# Patient Record
Sex: MALE | Race: WHITE | NOT HISPANIC OR LATINO
[De-identification: names, ages, dates, MRNs, and addresses within clinical notes are randomized per-mention and may not be internally consistent; named-entity substitution may affect disease eponyms.]

---

## 2017-01-25 PROBLEM — Z00.00 ENCOUNTER FOR PREVENTIVE HEALTH EXAMINATION: Status: ACTIVE | Noted: 2017-01-25

## 2017-01-31 ENCOUNTER — NON-APPOINTMENT (OUTPATIENT)
Age: 72
End: 2017-01-31

## 2017-01-31 ENCOUNTER — APPOINTMENT (OUTPATIENT)
Dept: ELECTROPHYSIOLOGY | Facility: CLINIC | Age: 72
End: 2017-01-31

## 2017-01-31 VITALS
BODY MASS INDEX: 29.06 KG/M2 | WEIGHT: 164 LBS | SYSTOLIC BLOOD PRESSURE: 148 MMHG | DIASTOLIC BLOOD PRESSURE: 63 MMHG | OXYGEN SATURATION: 98 % | HEART RATE: 92 BPM | HEIGHT: 63 IN

## 2017-01-31 VITALS — WEIGHT: 164 LBS | HEIGHT: 63 IN | BODY MASS INDEX: 29.06 KG/M2

## 2017-01-31 DIAGNOSIS — Z86.79 PERSONAL HISTORY OF OTHER DISEASES OF THE CIRCULATORY SYSTEM: ICD-10-CM

## 2017-01-31 DIAGNOSIS — Z86.19 PERSONAL HISTORY OF OTHER INFECTIOUS AND PARASITIC DISEASES: ICD-10-CM

## 2017-01-31 DIAGNOSIS — Z87.09 PERSONAL HISTORY OF OTHER DISEASES OF THE RESPIRATORY SYSTEM: ICD-10-CM

## 2017-01-31 DIAGNOSIS — F15.90 OTHER STIMULANT USE, UNSPECIFIED, UNCOMPLICATED: ICD-10-CM

## 2017-01-31 DIAGNOSIS — Z78.9 OTHER SPECIFIED HEALTH STATUS: ICD-10-CM

## 2017-01-31 DIAGNOSIS — Z80.8 FAMILY HISTORY OF MALIGNANT NEOPLASM OF OTHER ORGANS OR SYSTEMS: ICD-10-CM

## 2017-01-31 DIAGNOSIS — Z86.39 PERSONAL HISTORY OF OTHER ENDOCRINE, NUTRITIONAL AND METABOLIC DISEASE: ICD-10-CM

## 2017-01-31 DIAGNOSIS — A04.7 ENTEROCOLITIS DUE TO CLOSTRIDIUM DIFFICILE: ICD-10-CM

## 2017-02-02 ENCOUNTER — OUTPATIENT (OUTPATIENT)
Dept: OUTPATIENT SERVICES | Facility: HOSPITAL | Age: 72
LOS: 1 days | Discharge: HOME | End: 2017-02-02

## 2017-02-02 VITALS
HEART RATE: 86 BPM | OXYGEN SATURATION: 99 % | RESPIRATION RATE: 20 BRPM | DIASTOLIC BLOOD PRESSURE: 71 MMHG | TEMPERATURE: 98 F | WEIGHT: 160.06 LBS | SYSTOLIC BLOOD PRESSURE: 117 MMHG | HEIGHT: 63 IN

## 2017-02-02 DIAGNOSIS — I47.0 RE-ENTRY VENTRICULAR ARRHYTHMIA: ICD-10-CM

## 2017-02-02 DIAGNOSIS — Z90.89 ACQUIRED ABSENCE OF OTHER ORGANS: Chronic | ICD-10-CM

## 2017-02-02 DIAGNOSIS — Z01.810 ENCOUNTER FOR PREPROCEDURAL CARDIOVASCULAR EXAMINATION: ICD-10-CM

## 2017-02-02 DIAGNOSIS — Z98.890 OTHER SPECIFIED POSTPROCEDURAL STATES: Chronic | ICD-10-CM

## 2017-02-02 DIAGNOSIS — Z96.643 PRESENCE OF ARTIFICIAL HIP JOINT, BILATERAL: Chronic | ICD-10-CM

## 2017-02-02 LAB
ANION GAP SERPL CALC-SCNC: 9 MMOL/L — SIGNIFICANT CHANGE UP (ref 5–17)
BASOPHILS # BLD AUTO: 0.1 K/UL — SIGNIFICANT CHANGE UP (ref 0–0.2)
BASOPHILS NFR BLD AUTO: 1.7 % — SIGNIFICANT CHANGE UP (ref 0–2)
BUN SERPL-MCNC: 16 MG/DL — SIGNIFICANT CHANGE UP (ref 7–23)
CALCIUM SERPL-MCNC: 8.8 MG/DL — SIGNIFICANT CHANGE UP (ref 8.5–10.1)
CHLORIDE SERPL-SCNC: 109 MMOL/L — HIGH (ref 96–108)
CO2 SERPL-SCNC: 24 MMOL/L — SIGNIFICANT CHANGE UP (ref 22–31)
CREAT SERPL-MCNC: 0.93 MG/DL — SIGNIFICANT CHANGE UP (ref 0.5–1.3)
EOSINOPHIL # BLD AUTO: 0.1 K/UL — SIGNIFICANT CHANGE UP (ref 0–0.5)
EOSINOPHIL NFR BLD AUTO: 1.8 % — SIGNIFICANT CHANGE UP (ref 0–6)
GLUCOSE SERPL-MCNC: 93 MG/DL — SIGNIFICANT CHANGE UP (ref 70–99)
HCT VFR BLD CALC: 35.6 % — LOW (ref 39–50)
HGB BLD-MCNC: 12.2 G/DL — LOW (ref 13–17)
LYMPHOCYTES # BLD AUTO: 2.1 K/UL — SIGNIFICANT CHANGE UP (ref 1–3.3)
LYMPHOCYTES # BLD AUTO: 32.4 % — SIGNIFICANT CHANGE UP (ref 13–44)
MCHC RBC-ENTMCNC: 32.2 PG — SIGNIFICANT CHANGE UP (ref 27–34)
MCHC RBC-ENTMCNC: 34.2 GM/DL — SIGNIFICANT CHANGE UP (ref 32–36)
MCV RBC AUTO: 94.3 FL — SIGNIFICANT CHANGE UP (ref 80–100)
MONOCYTES # BLD AUTO: 0.5 K/UL — SIGNIFICANT CHANGE UP (ref 0–0.9)
MONOCYTES NFR BLD AUTO: 7.6 % — SIGNIFICANT CHANGE UP (ref 2–14)
NEUTROPHILS # BLD AUTO: 3.8 K/UL — SIGNIFICANT CHANGE UP (ref 1.8–7.4)
NEUTROPHILS NFR BLD AUTO: 56.6 % — SIGNIFICANT CHANGE UP (ref 43–77)
PLATELET # BLD AUTO: 289 K/UL — SIGNIFICANT CHANGE UP (ref 150–400)
POTASSIUM SERPL-MCNC: 3.4 MMOL/L — LOW (ref 3.5–5.3)
POTASSIUM SERPL-SCNC: 3.4 MMOL/L — LOW (ref 3.5–5.3)
RBC # BLD: 3.77 M/UL — LOW (ref 4.2–5.8)
RBC # FLD: 13.1 % — SIGNIFICANT CHANGE UP (ref 10.3–14.5)
SODIUM SERPL-SCNC: 142 MMOL/L — SIGNIFICANT CHANGE UP (ref 135–145)
WBC # BLD: 6.6 K/UL — SIGNIFICANT CHANGE UP (ref 3.8–10.5)
WBC # FLD AUTO: 6.6 K/UL — SIGNIFICANT CHANGE UP (ref 3.8–10.5)

## 2017-02-02 RX ORDER — CHOLECALCIFEROL (VITAMIN D3) 125 MCG
1 CAPSULE ORAL
Qty: 0 | Refills: 0 | COMMUNITY

## 2017-02-02 NOTE — H&P PST ADULT - PSH
History of bilateral hip replacements  2009, 2010  History of tonsillectomy  1948  S/P coronary angiogram  2016  S/P debridement  elbow wound 2010

## 2017-02-02 NOTE — H&P PST ADULT - NSANTHOSAYNRD_GEN_A_CORE
No. MELQUIADES screening performed.  STOP BANG Legend: 0-2 = LOW Risk; 3-4 = INTERMEDIATE Risk; 5-8 = HIGH Risk

## 2017-02-02 NOTE — H&P PST ADULT - HISTORY OF PRESENT ILLNESS
70 y/o male with of history of septic shock, respiratory failure and atrial fibrillation. Pt was admitted at [facility]. Upon discharge from the hospital, pt was told to follow up with cardiologist. Pt was seen and evaluated by Dr. Rowan who recommended EPS. 72 y/o male with of history of septic shock, respiratory failure and atrial fibrillation. Pt was admitted at [facility]. Upon discharge from the hospital, pt was told to follow up with cardiologist. Pt was seen and evaluated by Dr. Rowan who recommended EPS. Denies palpitations, chest pain, SOB.

## 2017-02-02 NOTE — H&P PST ADULT - ASSESSMENT
70 y/o male with history of atrial fibrillation, wide complex arrhythmia. Schedule for EPS with Dr. Rowan.    Plan  Instructed to follow pre procedure instructions as per EP MD/NP.

## 2017-02-02 NOTE — H&P PST ADULT - PMH
Atrial fibrillation  2016  Clostridium difficile infection  2016  Colitis  2016  Herpes zoster    Hypothyroidism    Respiratory failure  intubated x 14 days in 2016  Septic shock  2016  Tachycardia  wide complex tachycardia  Toxic effect of benzodiazepine  2016

## 2017-02-08 DIAGNOSIS — Z01.810 ENCOUNTER FOR PREPROCEDURAL CARDIOVASCULAR EXAMINATION: ICD-10-CM

## 2017-02-08 DIAGNOSIS — I47.0 RE-ENTRY VENTRICULAR ARRHYTHMIA: ICD-10-CM

## 2017-02-15 RX ORDER — SACCHAROMYCES BOULARDII 250 MG
1 POWDER IN PACKET (EA) ORAL
Qty: 0 | Refills: 0 | COMMUNITY

## 2017-02-15 RX ORDER — L.ACIDOPH/B.ANIMALIS/B.LONGUM 15B CELL
1 CAPSULE ORAL
Qty: 0 | Refills: 0 | COMMUNITY

## 2017-02-15 RX ORDER — PREGABALIN 225 MG/1
1 CAPSULE ORAL
Qty: 0 | Refills: 0 | COMMUNITY

## 2017-02-15 RX ORDER — CHOLECALCIFEROL (VITAMIN D3) 125 MCG
1 CAPSULE ORAL
Qty: 0 | Refills: 0 | COMMUNITY

## 2017-02-15 RX ORDER — ASCORBIC ACID 60 MG
1 TABLET,CHEWABLE ORAL
Qty: 0 | Refills: 0 | COMMUNITY

## 2017-02-15 NOTE — ASU PATIENT PROFILE, ADULT - VISION (WITH CORRECTIVE LENSES IF THE PATIENT USUALLY WEARS THEM):
uses reading glasses/Partially impaired: cannot see medication labels or newsprint, but can see obstacles in path, and the surrounding layout; can count fingers at arm's length

## 2017-02-16 ENCOUNTER — OUTPATIENT (OUTPATIENT)
Dept: OUTPATIENT SERVICES | Facility: HOSPITAL | Age: 72
LOS: 1 days | Discharge: HOME | End: 2017-02-16

## 2017-02-16 VITALS
RESPIRATION RATE: 18 BRPM | WEIGHT: 160.94 LBS | HEART RATE: 73 BPM | OXYGEN SATURATION: 100 % | SYSTOLIC BLOOD PRESSURE: 125 MMHG | TEMPERATURE: 98 F | DIASTOLIC BLOOD PRESSURE: 55 MMHG | HEIGHT: 63 IN

## 2017-02-16 VITALS — RESPIRATION RATE: 20 BRPM | HEART RATE: 71 BPM | DIASTOLIC BLOOD PRESSURE: 64 MMHG | SYSTOLIC BLOOD PRESSURE: 111 MMHG

## 2017-02-16 DIAGNOSIS — Z98.890 OTHER SPECIFIED POSTPROCEDURAL STATES: Chronic | ICD-10-CM

## 2017-02-16 DIAGNOSIS — Z96.643 PRESENCE OF ARTIFICIAL HIP JOINT, BILATERAL: Chronic | ICD-10-CM

## 2017-02-16 DIAGNOSIS — Z90.89 ACQUIRED ABSENCE OF OTHER ORGANS: Chronic | ICD-10-CM

## 2017-02-16 RX ORDER — ACETAMINOPHEN 500 MG
2 TABLET ORAL
Qty: 0 | Refills: 0 | COMMUNITY
Start: 2017-02-16

## 2017-02-16 RX ORDER — ACETAMINOPHEN 500 MG
650 TABLET ORAL EVERY 6 HOURS
Qty: 0 | Refills: 0 | Status: DISCONTINUED | OUTPATIENT
Start: 2017-02-16 | End: 2017-03-03

## 2017-02-16 NOTE — CHART NOTE - NSCHARTNOTEFT_GEN_A_CORE
Electrophysiology Study Report / [facility]    Patient Information  Patient Name		Evgeny Bryant  Study Date		February 16, 2017  Account Number	[id number]  Date of Birth		1945  Referring Physician	Celestine Waters MD    Patient History:          This patient is a 71-year-old male was hospitalized at [facility] with colitis and sepsis and in the setting of maximal pressors to maintain blood pressure he was noted to have a wide-complex tachycardia of unclear origin (VT versus SVT with aberrancy). This resolved spontaneously. He underwent cardiac workup including left heart catheterization and was noted to have nonobstructive coronary artery disease and normal left ventricular systolic function. There is no prior history of DVT or syncope or palpitations. He was placed on a LifeVest and has not had any therapy. He was also placed on Eliquis (apixaban) although I don't see any history of thrombus or atrial fibrillation upon review of records. There is no history of stroke. There is no history of valvular disease.           Procedure:   Diagnostic EP Study  Indication:     Ventricular Tachycardia  / Ischemic Cardiomyopathy w/ mild LV dysfunction  Anesthesia:   Local (1%) Lidocaine and deep sedation (see note from anesthesiologist)  Methods:  The right and left groins were  prepped with chorhexidine and then draped via sterile technique.  A 1% lidocaine solution was used to anesthetize the right groin site.  Using the modified seldinger techni	que acess to the right and left femoral veins was achieved and sheaths were placed (see below for further details).  Catheters were brought up to the heart and diagnostic EP testing was performed.  At the completion of the procedure  the catheters were removed the sheaths were removed and pressure was held and hemostasis obtained.  The patient was observed in the recovery diehl and was noted to be in stable condition.  Equipment:            Sheaths  Right Femoral vein - 6 fr Sheath, 5 fr Sheath, 5 fr Sheath  Left Femoral vein – 8 fr Sheath, 7 fr Sheath  Catheters            Jsn X2, CRD-2,    Findings:   Baseline Intervals                 SCL –772ms CA – 210ms QRS – 143ms    QT-   417ms   AH-  115ms     HV-  49ms    		Sinus Node Testing – SNRT  @500ms / cSNRT  292ms              Ventricular Testing               VABCL-  440ms                    Atrial Testing                AVNBCL-   420ms                RAERP- 500/230ms              Notes  	Retrograde conduction to the atrium with ventricular pacing was concentric and revealed decrement.   No evidence of accessory pathway.  	The patient had easily inducible AVNRT with ventricular overdrive pacing.  A V-A-V response was noted w/ VOD during SVT and the VA time was 0.    Complications:   None    Impression:  Inducible typical AVNRT    Recommendation(s):    Plan for Slow Pathway modification next week to treat this SVT condition utilizing 3D mapping (CARTO system).           Celestine Waters MD, FHRS, FACC    of Cardiology  - [facility]

## 2017-02-23 ENCOUNTER — OUTPATIENT (OUTPATIENT)
Dept: OUTPATIENT SERVICES | Facility: HOSPITAL | Age: 72
LOS: 1 days | Discharge: HOME | End: 2017-02-23
Payer: MEDICARE

## 2017-02-23 VITALS
DIASTOLIC BLOOD PRESSURE: 69 MMHG | HEART RATE: 60 BPM | WEIGHT: 160.28 LBS | RESPIRATION RATE: 18 BRPM | SYSTOLIC BLOOD PRESSURE: 130 MMHG | TEMPERATURE: 98 F | OXYGEN SATURATION: 100 %

## 2017-02-23 DIAGNOSIS — Z79.01 LONG TERM (CURRENT) USE OF ANTICOAGULANTS: ICD-10-CM

## 2017-02-23 DIAGNOSIS — I25.10 ATHEROSCLEROTIC HEART DISEASE OF NATIVE CORONARY ARTERY WITHOUT ANGINA PECTORIS: ICD-10-CM

## 2017-02-23 DIAGNOSIS — Z98.890 OTHER SPECIFIED POSTPROCEDURAL STATES: Chronic | ICD-10-CM

## 2017-02-23 DIAGNOSIS — I48.91 UNSPECIFIED ATRIAL FIBRILLATION: ICD-10-CM

## 2017-02-23 DIAGNOSIS — E03.9 HYPOTHYROIDISM, UNSPECIFIED: ICD-10-CM

## 2017-02-23 DIAGNOSIS — Z90.89 ACQUIRED ABSENCE OF OTHER ORGANS: Chronic | ICD-10-CM

## 2017-02-23 DIAGNOSIS — I47.1 SUPRAVENTRICULAR TACHYCARDIA: ICD-10-CM

## 2017-02-23 DIAGNOSIS — R00.2 PALPITATIONS: ICD-10-CM

## 2017-02-23 DIAGNOSIS — Z96.643 PRESENCE OF ARTIFICIAL HIP JOINT, BILATERAL: Chronic | ICD-10-CM

## 2017-02-23 PROCEDURE — 93010 ELECTROCARDIOGRAM REPORT: CPT

## 2017-02-23 RX ORDER — SACCHAROMYCES BOULARDII 250 MG
1 POWDER IN PACKET (EA) ORAL
Qty: 0 | Refills: 0 | COMMUNITY

## 2017-02-23 NOTE — CHART NOTE - NSCHARTNOTEFT_GEN_A_CORE
[place] [address]  Electrophysiology Lab    EP Study and SVT Ablation  Electrophysiologic Study with catheter Ablation    Patient Information    Patient Name			Evgeny Bryant  Study Date			2017  MRN				[id number]  				1945  Age				71yrs  Gender				Male  Referring			Self  Electrophysiologist		Celestine Waters MD    Procedure: Diagnostic EP Study and SVT Ablation  Indication: SVT  Anesthesia: As per anesthesiology note and 1% Lidocaine to each groin site  Methods:   The right and left groins were prepped with chorhexidine and then draped via sterile technique.  A 1% lidocaine solution was used to anesthetize each groin site.  Using the modified seldinger technique left and right femoral venous access was obtained and sheaths were placed (see below for further details).  Catheters were brought up to the heart and diagnostic EP testing was performed followed by SVT ablation (please see below for further detail).  At the completion of the procedure the catheters were removed (Figure of 8 – 0 Ethibond suture was applied to each groin site) the sheaths were removed and pressure was held and hemostasis obtained.  The patient was observed in the recovery diehl and was noted to be in stable condition.        Equipment:            Sheaths  Left Femoral vein - 6 fr Sheath, 5 fr Sheath, 5 fr Sheath  Right Femoral vein - 8 fr Sheath (SR0 exchanged), 7 fr sheath  Catheters            Jsn X2, CRD-2, deflectable decapolar, BioSense 4mm Ablation Catheter  Mapping            CARTO 3 D Mapping   Findings:            Baseline Intervals               SCL –816ms     SC –170ms     QRS -  151ms   QT- 448ms  AH-  133ms    HV- 56ms                         Ventricular Testing              VABCL- 420ms  	VF was induced w/ quadruple extra-stimuli of unclear significance and reverted w/ 360J DC Cardioversion.  A MUST (2 drive trains with up to 3 extra-stims and 2 different sites in the RV) protocol was then used with no inducible, sustained VT or VF.  	               -             Atrial Testing                AVNBCL- 500ms                              Notes                 - Dual AV nodes were demonstrated.  AVNRT was nearly incessant and easily induced w/ ventricular overdrive pacing.  A “V-A-V” response w/ VOD was noted and the VA time during this slow AVNRT was 0 and the cycle length of the arrhythmia was around 520ms.           Ablation:            A His cloud was created using the CARTO 3D mapping system.  The ablation catheter was positioned along the tricuspid annulus immediately anterior to the coronary sinus ostium.  The AV ratio recording from the distal electrode pair was about 1:3.  RF applications were delivered at about site 8 (on the 12 point scale) and numerous junctionals were elicited.  There were no dropped atrial beats noted during ablation and special care was taken to observe for them.    Post Ablation Testing:            Baseline intervals including the HV interval after the final RF application were measured and noted to be unchanged from prior to ablation.  Isuprel was used to re-assess for any inducible arrhythmias and there were none inducible.         Complications:            None      Impression:  •	     Inducible Typical AV Node Reentry.   •	     Successful slow pathway modification for treatment of this condition.     Recommendation(s):    •	Follow-up in 2-4 weeks as OP  •	Observe on telemetry for 5 hours and then discharge in am after suture removal if stable             Celestine Waters MD, RS, FACC    of Cardiology [facility]  ABIM Certification in Cardiac EP / Cardiovascular Disease & Internal Medicine  [facility]

## 2017-02-24 ENCOUNTER — TRANSCRIPTION ENCOUNTER (OUTPATIENT)
Age: 72
End: 2017-02-24

## 2017-02-24 VITALS
OXYGEN SATURATION: 96 % | DIASTOLIC BLOOD PRESSURE: 57 MMHG | TEMPERATURE: 98 F | RESPIRATION RATE: 18 BRPM | SYSTOLIC BLOOD PRESSURE: 103 MMHG | HEART RATE: 57 BPM

## 2017-02-24 DIAGNOSIS — R00.0 TACHYCARDIA, UNSPECIFIED: ICD-10-CM

## 2017-02-24 LAB
ANION GAP SERPL CALC-SCNC: 8 MMOL/L — SIGNIFICANT CHANGE UP (ref 5–17)
BASOPHILS # BLD AUTO: 0.1 K/UL — SIGNIFICANT CHANGE UP (ref 0–0.2)
BASOPHILS NFR BLD AUTO: 1 % — SIGNIFICANT CHANGE UP (ref 0–2)
BUN SERPL-MCNC: 16 MG/DL — SIGNIFICANT CHANGE UP (ref 7–23)
CALCIUM SERPL-MCNC: 8.6 MG/DL — SIGNIFICANT CHANGE UP (ref 8.5–10.1)
CHLORIDE SERPL-SCNC: 107 MMOL/L — SIGNIFICANT CHANGE UP (ref 96–108)
CO2 SERPL-SCNC: 25 MMOL/L — SIGNIFICANT CHANGE UP (ref 22–31)
CREAT SERPL-MCNC: 0.88 MG/DL — SIGNIFICANT CHANGE UP (ref 0.5–1.3)
EOSINOPHIL # BLD AUTO: 0.2 K/UL — SIGNIFICANT CHANGE UP (ref 0–0.5)
EOSINOPHIL NFR BLD AUTO: 3 % — SIGNIFICANT CHANGE UP (ref 0–6)
GLUCOSE SERPL-MCNC: 103 MG/DL — HIGH (ref 70–99)
HCT VFR BLD CALC: 34.6 % — LOW (ref 39–50)
HGB BLD-MCNC: 11.9 G/DL — LOW (ref 13–17)
LYMPHOCYTES # BLD AUTO: 2.3 K/UL — SIGNIFICANT CHANGE UP (ref 1–3.3)
LYMPHOCYTES # BLD AUTO: 34.5 % — SIGNIFICANT CHANGE UP (ref 13–44)
MCHC RBC-ENTMCNC: 31.8 PG — SIGNIFICANT CHANGE UP (ref 27–34)
MCHC RBC-ENTMCNC: 34.4 GM/DL — SIGNIFICANT CHANGE UP (ref 32–36)
MCV RBC AUTO: 92.5 FL — SIGNIFICANT CHANGE UP (ref 80–100)
MONOCYTES # BLD AUTO: 0.6 K/UL — SIGNIFICANT CHANGE UP (ref 0–0.9)
MONOCYTES NFR BLD AUTO: 8.2 % — SIGNIFICANT CHANGE UP (ref 2–14)
NEUTROPHILS # BLD AUTO: 3.6 K/UL — SIGNIFICANT CHANGE UP (ref 1.8–7.4)
NEUTROPHILS NFR BLD AUTO: 53.2 % — SIGNIFICANT CHANGE UP (ref 43–77)
PLATELET # BLD AUTO: 229 K/UL — SIGNIFICANT CHANGE UP (ref 150–400)
POTASSIUM SERPL-MCNC: 4 MMOL/L — SIGNIFICANT CHANGE UP (ref 3.5–5.3)
POTASSIUM SERPL-SCNC: 4 MMOL/L — SIGNIFICANT CHANGE UP (ref 3.5–5.3)
RBC # BLD: 3.74 M/UL — LOW (ref 4.2–5.8)
RBC # FLD: 12.8 % — SIGNIFICANT CHANGE UP (ref 10.3–14.5)
SODIUM SERPL-SCNC: 140 MMOL/L — SIGNIFICANT CHANGE UP (ref 135–145)
WBC # BLD: 6.7 K/UL — SIGNIFICANT CHANGE UP (ref 3.8–10.5)
WBC # FLD AUTO: 6.7 K/UL — SIGNIFICANT CHANGE UP (ref 3.8–10.5)

## 2017-02-24 PROCEDURE — 93010 ELECTROCARDIOGRAM REPORT: CPT

## 2017-02-24 NOTE — PROGRESS NOTE ADULT - ATTENDING COMMENTS
Pt  is S/P SVT ablation POD#1 stable for discharge-assesment as above Denies any c/o CP SOB or palpitations NSR

## 2017-02-24 NOTE — DISCHARGE NOTE ADULT - MEDICATION SUMMARY - MEDICATIONS TO TAKE
I will START or STAY ON the medications listed below when I get home from the hospital:    acetaminophen 325 mg oral tablet  -- 2 tab(s) by mouth every 6 hours, As needed, Mild Pain (1 - 3)  -- Indication: For .    Florastor 250 mg oral capsule  -- 1 cap(s) by mouth 2 times a day  -- Indication: For .

## 2017-02-24 NOTE — DISCHARGE NOTE ADULT - CARE PLAN
Principal Discharge DX:	Tachycardia  Goal:	groin healing  Instructions for follow-up, activity and diet:	f/u next week with Dr Evelin Sandoval

## 2017-02-24 NOTE — DISCHARGE NOTE ADULT - PATIENT PORTAL LINK FT
“You can access the FollowHealth Patient Portal, offered by [facility], by registering with the following website: [email or web address]”

## 2017-02-24 NOTE — PROGRESS NOTE ADULT - SUBJECTIVE AND OBJECTIVE BOX
Patient is a 71y old  Male who presents with a chief complaint of palpitations, irregular heartbeat, s/p SVT Carto Ablation POD #1    HPI: This is a 72 yo male who was hospitalized at [facility] in January 2017 with colitis and sepsis, noted to have a WCT at that time SVT vs VT. Underwent cardiac workup,  nonobstructive coronary disease,, normal LV systolic function. Pt is now s/p SVT ablation POD#1.      PAST MEDICAL & SURGICAL HISTORY:  Herpes zoster  Colitis: 2016  Toxic effect of benzodiazepine: 2016  Respiratory failure: intubated x 14 days in 2016  Septic shock: 2016  Tachycardia: wide complex tachycardia  Atrial fibrillation: 2016  Clostridium difficile infection: 2016  Hypothyroidism  S/P coronary angiogram: 2016  S/P debridement: elbow wound 2010  History of tonsillectomy: 1948  History of bilateral hip replacements: 2009, 2010        :      FAMILY HISTORY:  Family history of cancer (Father): father had spinal cord cancer           A comprehensive review of systems was performed and pertinent items are noted in the history above. A detailed ROS is as follows:    Constitutional	     Negative for anorexia, appetite changes, chills, fatigue, fevers, malaise, sweats and weight gain / loss.  Eyes: 	                         Negative for icterus, irritation, redness and visual disturbance.  ENT, mouth and face:	     Negative for ear discharge, earaches, hearing loss, tinnitus,  epistaxis, nasal congestion, snoring, sleep apnea, oral sores, dental and gum infection, sore throat hoarseness or voice change.  Neck:	                         Negative for thyroid enlargement, neck pain, swollen glands and difficulty in swallowing  Respiratory:                       Negative for asthma, chronic bronchitis, cough, dyspnea on exertion, emphysema, hemoptysis, pleurisy/chest pain, pneumonia, sputum, stridor and wheezing  Cardiovascular:                  Negative for chest pain, dyspnea, fatigue, irregular heart beat, near-syncope, orthopnea, palpitations, paroxysmal nocturnal dyspnea and syncope  Gastrointestinal:	      Negative for abdominal pain, nausea, vomiting, change in bowel habits, constipation, diarrhea, dyspepsia, dysphagia, odynophagia, reflux symptoms, jaundice, hematemesis, melena and hematochezia.  Genitourinary:	      Negative for genital lesions, discharge, bleeding, sexual problems, dysuria, frequency, hematuria and urinary incontinence.  Skin / Breast: 	      Negative for breast lump, breast tenderness. Negative for skin rash, redness, pruritis, swelling dryness and fissures.  Hematologic/lymphatic:   Negative for bleeding disorder, clotting disorder, petechial rash, easy bruising and lymphadenopathy.  Musculoskeletal:	      Negative for arthralgias, back pain, bone pain, muscle weakness, myalgias, neck pain and stiff joints  Vascular:	                          No leg pain, cramps, discoloration or edema.   Neurological:	      Negative for coordination problems, dizziness, gait problems, headaches, memory problems, paresthesia, seizures, speech problems, tremors, vertigo and weakness  Behavioral/Psych: 	      Negative for mood change, depression, anxiety, suicidal attempts.  Endocrine:	                          Negative for blurry vision, increased fatigue, polydipsia, polyphagia, polyuria, poor wound healing, pruritus, skin dryness and weight loss, fertility problems and temperature intolerance.  Allergic/Immunologic:	      Negative for anaphylaxis, angioedema and urticaria.  Extrenities/ Groin: Bilateral groin sutures removed, sites soft, nontender, no evidence of bleeding or hematoma. AGUILAR Positive pedal pulses, extremities warm and mobile.      Vital Signs Last 24 Hrs  T(C): 36.8, Max: 37.1 (02-23 @ 22:22)  T(F): 98.3, Max: 98.8 (02-24 @ 02:00)  HR: 57 (56 - 76)  BP: 103/57 (90/58 - 130/69)  BP(mean): --  RR: 18 (18 - 20)  SpO2: 96% (95% - 100%)    I&O's Summary    I & Os for current day (as of 24 Feb 2017 08:26)  =============================================  IN: 0 ml / OUT: 725 ml / NET: -725 ml        INTERPRETATION OF TELEMETRY:NSR-sinus eliot HR 58-80s with UT interval 202ms  rare PAC, and inc RBBB.       ECG:NSR with LAD, Inc RBBB UT-202ms HR 60        LABS:                          11.9   6.7   )-----------( 229      ( 24 Feb 2017 05:59 )             34.6     24 Feb 2017 05:59    140    |  107    |  16     ----------------------------<  103    4.0     |  25     |  0.88     Ca    8.6        24 Feb 2017 05:59

## 2017-02-24 NOTE — DISCHARGE NOTE ADULT - CARE PROVIDER_API CALL
Celestine Waters (MD), Cardiac Electrophysiology; Cardiovascular Disease; Internal Medicine  [address] [place] [address]  Phone: [phone]  Fax: [phone]

## 2017-02-24 NOTE — DISCHARGE NOTE ADULT - CARE PROVIDERS DIRECT ADDRESSES
,[email or web address]

## 2017-02-25 DIAGNOSIS — Z79.01 LONG TERM (CURRENT) USE OF ANTICOAGULANTS: ICD-10-CM

## 2017-02-25 DIAGNOSIS — I25.10 ATHEROSCLEROTIC HEART DISEASE OF NATIVE CORONARY ARTERY WITHOUT ANGINA PECTORIS: ICD-10-CM

## 2017-02-25 DIAGNOSIS — I47.1 SUPRAVENTRICULAR TACHYCARDIA: ICD-10-CM

## 2017-02-25 DIAGNOSIS — I48.91 UNSPECIFIED ATRIAL FIBRILLATION: ICD-10-CM

## 2017-02-25 DIAGNOSIS — I25.5 ISCHEMIC CARDIOMYOPATHY: ICD-10-CM

## 2017-02-25 DIAGNOSIS — I47.2 VENTRICULAR TACHYCARDIA: ICD-10-CM

## 2017-03-01 ENCOUNTER — APPOINTMENT (OUTPATIENT)
Dept: CARDIOLOGY | Facility: CLINIC | Age: 72
End: 2017-03-01

## 2017-03-01 ENCOUNTER — NON-APPOINTMENT (OUTPATIENT)
Age: 72
End: 2017-03-01

## 2017-03-01 VITALS — SYSTOLIC BLOOD PRESSURE: 120 MMHG | HEART RATE: 82 BPM | DIASTOLIC BLOOD PRESSURE: 79 MMHG | OXYGEN SATURATION: 95 %

## 2017-03-01 DIAGNOSIS — I49.9 CARDIAC ARRHYTHMIA, UNSPECIFIED: ICD-10-CM

## 2017-03-01 RX ORDER — DILTIAZEM HYDROCHLORIDE 180 MG/1
180 TABLET, EXTENDED RELEASE ORAL DAILY
Refills: 0 | Status: DISCONTINUED | COMMUNITY
End: 2017-03-01

## 2017-03-01 RX ORDER — APIXABAN 5 MG/1
5 TABLET, FILM COATED ORAL
Refills: 0 | Status: COMPLETED | COMMUNITY
End: 2017-01-31

## 2017-03-01 RX ORDER — GLUCOSAMINE/CHONDR SU A SOD 750-600 MG
1000 TABLET ORAL
Refills: 0 | Status: ACTIVE | COMMUNITY

## 2017-03-01 RX ORDER — GINSENG 100 MG
CAPSULE ORAL
Refills: 0 | Status: DISCONTINUED | COMMUNITY
End: 2017-03-01

## 2017-03-02 ENCOUNTER — TRANSCRIPTION ENCOUNTER (OUTPATIENT)
Age: 72
End: 2017-03-02

## 2017-03-07 ENCOUNTER — APPOINTMENT (OUTPATIENT)
Dept: ELECTROPHYSIOLOGY | Facility: CLINIC | Age: 72
End: 2017-03-07

## 2017-03-07 ENCOUNTER — NON-APPOINTMENT (OUTPATIENT)
Age: 72
End: 2017-03-07

## 2017-03-07 VITALS
HEIGHT: 63 IN | WEIGHT: 164 LBS | OXYGEN SATURATION: 99 % | SYSTOLIC BLOOD PRESSURE: 103 MMHG | HEART RATE: 66 BPM | DIASTOLIC BLOOD PRESSURE: 66 MMHG | BODY MASS INDEX: 29.06 KG/M2

## 2017-03-07 RX ORDER — SODIUM SULFATE, POTASSIUM SULFATE, MAGNESIUM SULFATE 17.5; 3.13; 1.6 G/ML; G/ML; G/ML
17.5-3.13-1.6 SOLUTION, CONCENTRATE ORAL
Qty: 354 | Refills: 0 | Status: DISCONTINUED | COMMUNITY
Start: 2017-02-06

## 2017-03-07 RX ORDER — AMOXICILLIN 250 MG/1
250 CAPSULE ORAL
Qty: 1 | Refills: 0 | Status: DISCONTINUED | COMMUNITY
Start: 2017-02-28

## 2017-03-07 RX ORDER — CLINDAMYCIN HYDROCHLORIDE 300 MG/1
300 CAPSULE ORAL
Qty: 30 | Refills: 0 | Status: DISCONTINUED | COMMUNITY
Start: 2016-10-10

## 2017-03-07 RX ORDER — CYANOCOBALAMIN (VITAMIN B-12) 2000 MCG
2000 TABLET, EXTENDED RELEASE ORAL DAILY
Refills: 0 | Status: ACTIVE | COMMUNITY

## 2017-03-07 RX ORDER — DILTIAZEM HYDROCHLORIDE 180 MG/1
180 CAPSULE, EXTENDED RELEASE ORAL
Qty: 90 | Refills: 0 | Status: DISCONTINUED | COMMUNITY
Start: 2017-01-19

## 2017-03-07 RX ORDER — PANTOPRAZOLE 40 MG/1
40 TABLET, DELAYED RELEASE ORAL
Qty: 60 | Refills: 0 | Status: DISCONTINUED | COMMUNITY
Start: 2016-12-24

## 2017-03-07 RX ORDER — CHOLESTYRAMINE 4 G/9G
4 POWDER, FOR SUSPENSION ORAL
Qty: 60 | Refills: 0 | Status: DISCONTINUED | COMMUNITY
Start: 2016-12-24

## 2017-03-07 RX ORDER — LORATADINE 10 MG
TABLET ORAL
Qty: 60 | Refills: 0 | Status: DISCONTINUED | COMMUNITY
Start: 2016-12-24

## 2017-03-07 RX ORDER — VANCOMYCIN HYDROCHLORIDE 125 MG/1
125 CAPSULE ORAL
Qty: 40 | Refills: 0 | Status: DISCONTINUED | COMMUNITY
Start: 2016-12-24

## 2017-03-07 RX ORDER — OXYCODONE AND ACETAMINOPHEN 7.5; 325 MG/1; MG/1
7.5-325 TABLET ORAL
Qty: 20 | Refills: 0 | Status: DISCONTINUED | COMMUNITY
Start: 2016-10-15

## 2017-03-07 RX ORDER — MULTIVIT-MIN/FOLIC/VIT K/LYCOP 400-300MCG
1000 TABLET ORAL DAILY
Refills: 0 | Status: ACTIVE | COMMUNITY

## 2017-03-07 RX ORDER — CLONIDINE HYDROCHLORIDE 0.1 MG/1
0.1 TABLET ORAL
Qty: 90 | Refills: 0 | Status: DISCONTINUED | COMMUNITY
Start: 2016-12-24

## 2017-03-12 ENCOUNTER — NON-APPOINTMENT (OUTPATIENT)
Age: 72
End: 2017-03-12

## 2017-04-12 ENCOUNTER — APPOINTMENT (OUTPATIENT)
Dept: PEDIATRIC ALLERGY IMMUNOLOGY | Facility: CLINIC | Age: 72
End: 2017-04-12

## 2018-02-05 NOTE — ASU PREOP CHECKLIST - DNR CLARIFICATION FORM COMPLETED
Pressure Father     Other Sister      EPILEPSY        Social History   Substance Use Topics    Smoking status: Never Smoker    Smokeless tobacco: Never Used    Alcohol use No        Prior to Admission medications    Medication Sig Start Date End Date Taking? Authorizing Provider   divalproex (DEPAKOTE) 125 MG DR tablet Take 375 mg by mouth every morning Pt is to take 125mg and a 250mg tablet every morning   Yes Historical Provider, MD   QUEtiapine (SEROQUEL) 100 MG tablet Take 100 mg by mouth 2 times daily   Yes Historical Provider, MD   promethazine (PHENERGAN) 25 MG suppository Place 25 mg rectally every 6 hours as needed for Nausea   Yes Historical Provider, MD   acetaminophen (TYLENOL) 650 MG suppository Place 650 mg rectally every 4 hours as needed for Fever   Yes Historical Provider, MD   ibuprofen (ADVIL;MOTRIN) 600 MG tablet Take 600 mg by mouth every 6 hours as needed for Fever   Yes Historical Provider, MD   divalproex (DEPAKOTE) 250 MG DR tablet Take 250 mg by mouth daily (with breakfast)   Yes Historical Provider, MD   divalproex (DEPAKOTE) 250 MG DR tablet Take 1,250 mg by mouth nightly   Yes Historical Provider, MD   metoprolol tartrate (LOPRESSOR) 25 MG tablet Take 12.5 mg by mouth 2 times daily   Yes Historical Provider, MD   OXcarbazepine (TRILEPTAL) 600 MG tablet Take 600 mg by mouth 3 times daily   Yes Historical Provider, MD   QUEtiapine (SEROQUEL) 200 MG tablet Take 500 mg by mouth Daily with lunch    Yes Historical Provider, MD   acetaminophen (TYLENOL) 325 MG tablet Take 650 mg by mouth every 4 hours as needed for Pain or Fever   Yes Historical Provider, MD   busPIRone (BUSPAR) 30 MG tablet take 1 tablet by mouth twice a day 11/6/16  Yes Historical Provider, MD   enalapril (VASOTEC) 10 MG tablet Take 10 mg by mouth daily    Yes Historical Provider, MD   LORazepam (ATIVAN) 1 MG tablet Take 1 mg by mouth.     Historical Provider, MD   silver sulfADIAZINE (SILVADENE) 1 % cream Apply topically n/a

## 2018-03-14 ENCOUNTER — APPOINTMENT (OUTPATIENT)
Dept: CARDIOLOGY | Facility: CLINIC | Age: 73
End: 2018-03-14
Payer: MEDICARE

## 2018-03-14 ENCOUNTER — NON-APPOINTMENT (OUTPATIENT)
Age: 73
End: 2018-03-14

## 2018-03-14 VITALS
WEIGHT: 185 LBS | OXYGEN SATURATION: 93 % | HEIGHT: 63 IN | SYSTOLIC BLOOD PRESSURE: 114 MMHG | DIASTOLIC BLOOD PRESSURE: 68 MMHG | HEART RATE: 89 BPM | BODY MASS INDEX: 32.78 KG/M2

## 2018-03-14 DIAGNOSIS — Z01.810 ENCOUNTER FOR PREPROCEDURAL CARDIOVASCULAR EXAMINATION: ICD-10-CM

## 2018-03-14 PROCEDURE — 93000 ELECTROCARDIOGRAM COMPLETE: CPT

## 2018-03-14 PROCEDURE — 99214 OFFICE O/P EST MOD 30 MIN: CPT | Mod: 25

## 2018-07-16 PROBLEM — I48.91 UNSPECIFIED ATRIAL FIBRILLATION: Chronic | Status: ACTIVE | Noted: 2017-02-02

## 2019-03-27 ENCOUNTER — APPOINTMENT (OUTPATIENT)
Dept: CARDIOLOGY | Facility: CLINIC | Age: 74
End: 2019-03-27
Payer: MEDICARE

## 2019-03-27 ENCOUNTER — NON-APPOINTMENT (OUTPATIENT)
Age: 74
End: 2019-03-27

## 2019-03-27 VITALS
WEIGHT: 177 LBS | SYSTOLIC BLOOD PRESSURE: 128 MMHG | DIASTOLIC BLOOD PRESSURE: 76 MMHG | HEIGHT: 63 IN | BODY MASS INDEX: 31.36 KG/M2 | OXYGEN SATURATION: 96 % | HEART RATE: 91 BPM

## 2019-03-27 PROBLEM — B96.89 OTHER SPECIFIED BACTERIAL AGENTS AS THE CAUSE OF DISEASES CLASSIFIED ELSEWHERE: Chronic | Status: ACTIVE | Noted: 2017-02-02

## 2019-03-27 PROBLEM — B02.9 ZOSTER WITHOUT COMPLICATIONS: Chronic | Status: ACTIVE | Noted: 2017-02-02

## 2019-03-27 PROBLEM — J96.90 RESPIRATORY FAILURE, UNSPECIFIED, UNSPECIFIED WHETHER WITH HYPOXIA OR HYPERCAPNIA: Chronic | Status: ACTIVE | Noted: 2017-02-02

## 2019-03-27 PROBLEM — R00.0 TACHYCARDIA, UNSPECIFIED: Chronic | Status: ACTIVE | Noted: 2017-02-02

## 2019-03-27 PROBLEM — A41.9 SEPSIS, UNSPECIFIED ORGANISM: Chronic | Status: ACTIVE | Noted: 2017-02-02

## 2019-03-27 PROBLEM — T42.4X4A: Chronic | Status: ACTIVE | Noted: 2017-02-02

## 2019-03-27 PROBLEM — E03.9 HYPOTHYROIDISM, UNSPECIFIED: Chronic | Status: ACTIVE | Noted: 2017-02-02

## 2019-03-27 PROBLEM — K52.9 NONINFECTIVE GASTROENTERITIS AND COLITIS, UNSPECIFIED: Chronic | Status: ACTIVE | Noted: 2017-02-02

## 2019-03-27 PROCEDURE — 99214 OFFICE O/P EST MOD 30 MIN: CPT | Mod: 25

## 2019-03-27 PROCEDURE — 93000 ELECTROCARDIOGRAM COMPLETE: CPT

## 2019-03-27 NOTE — HISTORY OF PRESENT ILLNESS
[FreeTextEntry1] : Evgeny Bryant is a 72-year-old man with a history of lumbar spine infection, severe Clostridium difficile colitis (hospital course complicated by acute respiratory failure, hypotension, and  acute kidney injury), right bundle branch block, mild aortic insufficiency, and supraventricular tachycardia s/p ablation, who returns for routine cardiac examination.  He underwent hernia surgery his earlier this year and has been well from a cardiac standpoint.  He has had no recurrence of palpitations and generally feeling well.

## 2019-03-27 NOTE — REASON FOR VISIT
[Abnormal ECG] : an abnormal ECG [Supraventricular Tachycardia] : supraventricular tachycardia [Spouse] : spouse

## 2019-03-27 NOTE — DISCUSSION/SUMMARY
[___ Year(s)] : [unfilled] year(s) [With Me] : with me [FreeTextEntry1] : \par Supraventricular tachycardia: No recurrence s/p ablation.\par \par Right bundle branch block: Chronic.\par \par Aortic insufficiency: Mild when last assessed with echocardiography several years ago; return for imaging to assess for interval change.\par

## 2019-03-27 NOTE — PHYSICAL EXAM
[Well Groomed] : well groomed [General Appearance - In No Acute Distress] : no acute distress [No Oral Cyanosis] : no oral cyanosis [] : no respiratory distress [Respiration, Rhythm And Depth] : normal respiratory rhythm and effort [Auscultation Breath Sounds / Voice Sounds] : lungs were clear to auscultation bilaterally [Heart Rate And Rhythm] : heart rate and rhythm were normal [Heart Sounds] : normal S1 and S2 [Murmurs] : no murmurs present [Edema] : no peripheral edema present [Abnormal Walk] : normal gait [Cyanosis, Localized] : no localized cyanosis [Oriented To Time, Place, And Person] : oriented to person, place, and time [Impaired Insight] : insight and judgment were intact [Affect] : the affect was normal [Mood] : the mood was normal [FreeTextEntry1] : Obese

## 2019-03-27 NOTE — REVIEW OF SYSTEMS
[see HPI] : see HPI [Negative] : Heme/Lymph [Feeling Fatigued] : feeling fatigued [Recent Weight Loss (___ Lbs)] : recent [unfilled] ~Ulb weight loss [Shortness Of Breath] : no shortness of breath [Dyspnea on exertion] : not dyspnea during exertion [Chest  Pressure] : no chest pressure [Chest Pain] : no chest pain [Lower Ext Edema] : no extremity edema [Palpitations] : no palpitations [FreeTextEntry2] : Right inguinal pain from hernia

## 2019-03-28 ENCOUNTER — TRANSCRIPTION ENCOUNTER (OUTPATIENT)
Age: 74
End: 2019-03-28

## 2019-03-28 ENCOUNTER — APPOINTMENT (OUTPATIENT)
Dept: CARDIOLOGY | Facility: CLINIC | Age: 74
End: 2019-03-28
Payer: MEDICARE

## 2019-03-28 PROCEDURE — 93306 TTE W/DOPPLER COMPLETE: CPT

## 2019-03-28 RX ORDER — ALPHA LIPOIC ACID 50 MG
300 CAPSULE ORAL
Refills: 0 | Status: COMPLETED | COMMUNITY
End: 2019-03-28

## 2019-04-03 ENCOUNTER — RESULT REVIEW (OUTPATIENT)
Age: 74
End: 2019-04-03

## 2020-03-11 ENCOUNTER — APPOINTMENT (OUTPATIENT)
Dept: CARDIOLOGY | Facility: CLINIC | Age: 75
End: 2020-03-11
Payer: MEDICARE

## 2020-03-11 ENCOUNTER — NON-APPOINTMENT (OUTPATIENT)
Age: 75
End: 2020-03-11

## 2020-03-11 VITALS
OXYGEN SATURATION: 97 % | HEIGHT: 63 IN | HEART RATE: 78 BPM | SYSTOLIC BLOOD PRESSURE: 110 MMHG | BODY MASS INDEX: 30.65 KG/M2 | DIASTOLIC BLOOD PRESSURE: 66 MMHG | WEIGHT: 173 LBS

## 2020-03-11 DIAGNOSIS — Z87.898 PERSONAL HISTORY OF OTHER SPECIFIED CONDITIONS: ICD-10-CM

## 2020-03-11 PROCEDURE — 99214 OFFICE O/P EST MOD 30 MIN: CPT

## 2020-03-11 PROCEDURE — 93000 ELECTROCARDIOGRAM COMPLETE: CPT

## 2020-03-11 RX ORDER — MILK THISTLE 150 MG
CAPSULE ORAL
Refills: 0 | Status: ACTIVE | COMMUNITY

## 2020-03-11 RX ORDER — MULTIVIT-MIN/FOLIC/VIT K/LYCOP 400-300MCG
TABLET ORAL
Refills: 0 | Status: DISCONTINUED | COMMUNITY
End: 2020-03-11

## 2020-03-11 RX ORDER — SACCHAROMYCES BOULARDII 50 MG
250 CAPSULE ORAL
Refills: 0 | Status: DISCONTINUED | COMMUNITY
End: 2020-03-11

## 2020-03-11 RX ORDER — UBIDECARENONE/VIT E ACET 100MG-5
CAPSULE ORAL
Refills: 0 | Status: DISCONTINUED | COMMUNITY
End: 2020-03-11

## 2020-03-11 NOTE — REVIEW OF SYSTEMS
[Recent Weight Loss (___ Lbs)] : recent [unfilled] ~Ulb weight loss [see HPI] : see HPI [Negative] : Heme/Lymph [Shortness Of Breath] : no shortness of breath [Dyspnea on exertion] : not dyspnea during exertion [Chest  Pressure] : no chest pressure [Chest Pain] : no chest pain [Lower Ext Edema] : no extremity edema [Palpitations] : no palpitations [FreeTextEntry2] : Right inguinal pain from hernia

## 2020-03-11 NOTE — REASON FOR VISIT
[Follow-Up - Clinic] : a clinic follow-up of [Abnormal ECG] : an abnormal ECG [Supraventricular Tachycardia] : supraventricular tachycardia [Spouse] : spouse [FreeTextEntry1] : dilated thoracic aorta

## 2020-03-11 NOTE — PHYSICAL EXAM
[Well Groomed] : well groomed [General Appearance - In No Acute Distress] : no acute distress [] : no respiratory distress [Respiration, Rhythm And Depth] : normal respiratory rhythm and effort [Auscultation Breath Sounds / Voice Sounds] : lungs were clear to auscultation bilaterally [Heart Rate And Rhythm] : heart rate and rhythm were normal [Heart Sounds] : normal S1 and S2 [Abnormal Walk] : normal gait [Oriented To Time, Place, And Person] : oriented to person, place, and time [Impaired Insight] : insight and judgment were intact [Affect] : the affect was normal [Mood] : the mood was normal [FreeTextEntry1] : Overweight [Eyelids - No Xanthelasma] : the eyelids demonstrated no xanthelasmas

## 2020-03-11 NOTE — DISCUSSION/SUMMARY
[___ Year(s)] : [unfilled] year(s) [With Me] : with me [FreeTextEntry1] : \par Dilated aortic root: Mild; blood pressure is normal; reassess dimensions with echocardiography -- I will also try to obtain a copy of his most recent PET/CT to see if aortic dimensions were measured.  Chest CT from 2019 reveals tiny pulmonary nodules -- he says that there were no abnormal lung findings on recent imaging (CT/PET).\par \par Aortic valve insufficiency: Moderate in severity; asymptomatic; return for imaging to assess aortic valve function and left ventricular dimensions.\par \par Supraventricular tachycardia: No recurrence s/p ablation; occasional premature atrial complexes on today's ECG.\par \par Right bundle branch block: Chronic.\par

## 2020-03-11 NOTE — HISTORY OF PRESENT ILLNESS
[FreeTextEntry1] : Evgeny Bryant is a 74-year-old man with a history of lumbar spine infection, severe Clostridium difficile colitis (hospital course complicated by acute respiratory failure, hypotension, and  acute kidney injury), right bundle branch block, valvular heart disease (moderate aortic insufficiency, mild mitral regurgitation), supraventricular tachycardia s/p ablation (2017), and dilated ascending aorta who returns for routine cardiac examination.   He continues to feel well and offers no new cardiac complaints. He describes recent and extensive home improvement projects that he completed himself without difficulty - denies angina, dyspnea, palpitations, syncope.

## 2020-05-08 ENCOUNTER — APPOINTMENT (OUTPATIENT)
Dept: CARDIOLOGY | Facility: CLINIC | Age: 75
End: 2020-05-08
Payer: MEDICARE

## 2020-05-08 PROCEDURE — 93306 TTE W/DOPPLER COMPLETE: CPT

## 2020-09-10 NOTE — ASU PATIENT PROFILE, ADULT - HEALTHCARE QUESTIONS, PROFILE
Patient called asking if he should continue taking Keppra 500 mg. States he is in SNF and would need a prescription order given to [facility] if he is to continue on them.   Patient's cell number is [phone].   
Per , Dk should continue taking the Keppra 500 mg by mouth twice daily. Verbalized understanding. Prescription sent to [facility] as requested per Dr. Zhao's order. Denies any seizure activity. No other concerns at this time.  
none

## 2021-02-12 ENCOUNTER — TRANSCRIPTION ENCOUNTER (OUTPATIENT)
Age: 76
End: 2021-02-12

## 2021-02-24 ENCOUNTER — NON-APPOINTMENT (OUTPATIENT)
Age: 76
End: 2021-02-24

## 2021-02-24 ENCOUNTER — APPOINTMENT (OUTPATIENT)
Dept: CARDIOLOGY | Facility: CLINIC | Age: 76
End: 2021-02-24
Payer: MEDICARE

## 2021-02-24 VITALS
HEART RATE: 64 BPM | BODY MASS INDEX: 30.65 KG/M2 | DIASTOLIC BLOOD PRESSURE: 77 MMHG | SYSTOLIC BLOOD PRESSURE: 123 MMHG | HEIGHT: 63 IN | WEIGHT: 173 LBS

## 2021-02-24 DIAGNOSIS — I47.1 SUPRAVENTRICULAR TACHYCARDIA: ICD-10-CM

## 2021-02-24 DIAGNOSIS — I45.10 UNSPECIFIED RIGHT BUNDLE-BRANCH BLOCK: ICD-10-CM

## 2021-02-24 DIAGNOSIS — I35.1 NONRHEUMATIC AORTIC (VALVE) INSUFFICIENCY: ICD-10-CM

## 2021-02-24 DIAGNOSIS — I49.1 ATRIAL PREMATURE DEPOLARIZATION: ICD-10-CM

## 2021-02-24 DIAGNOSIS — I77.810 THORACIC AORTIC ECTASIA: ICD-10-CM

## 2021-02-24 PROCEDURE — 99214 OFFICE O/P EST MOD 30 MIN: CPT

## 2021-02-24 PROCEDURE — 93000 ELECTROCARDIOGRAM COMPLETE: CPT

## 2021-02-24 RX ORDER — SENNOSIDES 8.6 MG
TABLET ORAL
Refills: 0 | Status: ACTIVE | COMMUNITY

## 2021-02-24 RX ORDER — MULTIVIT-MIN/FOLIC/VIT K/LYCOP 400-300MCG
TABLET ORAL
Refills: 0 | Status: ACTIVE | COMMUNITY

## 2021-02-24 RX ORDER — SOY ISOFLAVONE 40 MG
1000 TABLET ORAL
Refills: 0 | Status: ACTIVE | COMMUNITY

## 2021-02-24 NOTE — HISTORY OF PRESENT ILLNESS
[FreeTextEntry1] : Evgeny Bryant is a 75-year-old man with a history of lumbar spine infection, severe Clostridium difficile colitis (hospital course complicated by acute respiratory failure, hypotension, and acute kidney injury), right bundle branch block, valvular heart disease (moderate aortic insufficiency, mild mitral regurgitation), supraventricular tachycardia s/p ablation (2017), and dilated ascending aorta who returns for routine cardiac examination - our last encounter was in March 2020. In August 2020 he underwent transurethral resection of bladder tumor.  He continues to feel well from a cardiovascular standpoint. He describes daily exercise with no change in stamina or performance; no exertional dyspnea or angina.  He is planning to move to [place] later this year.

## 2021-02-24 NOTE — PHYSICAL EXAM
[Well Groomed] : well groomed [General Appearance - In No Acute Distress] : no acute distress [Respiration, Rhythm And Depth] : normal respiratory rhythm and effort [Auscultation Breath Sounds / Voice Sounds] : lungs were clear to auscultation bilaterally [Heart Sounds] : normal S1 and S2 [Abnormal Walk] : normal gait [Oriented To Time, Place, And Person] : oriented to person, place, and time [Impaired Insight] : insight and judgment were intact [Affect] : the affect was normal [Mood] : the mood was normal [Edema] : no peripheral edema present [FreeTextEntry1] : Irregular, Diastolic murmur [] : no rash

## 2021-02-24 NOTE — REVIEW OF SYSTEMS
[Negative] : Heme/Lymph [Shortness Of Breath] : no shortness of breath [Dyspnea on exertion] : not dyspnea during exertion [Chest  Pressure] : no chest pressure [Chest Pain] : no chest pain [Lower Ext Edema] : no extremity edema [Palpitations] : no palpitations [see HPI] : see HPI [Joint Pain] : joint pain [FreeTextEntry2] : Right inguinal pain from hernia

## 2021-02-24 NOTE — DISCUSSION/SUMMARY
[___ Year(s)] : [unfilled] year(s) [With Me] : with me [FreeTextEntry1] : \par Dilated aortic root: Mild dilation on past imaging-- although echo in Spring 2020 revealed normal root size; blood pressure remains well controlled .\par \par Aortic valve insufficiency: Moderate in severity -- stable / chronic /  asymptomatic. I anticipate periodic imaging to assess for interval change and heard him to establish care with a new cardiologist upon his relocation to [place].\par \par Supraventricular tachycardia: No recurrence s/p ablation; he has had frequent premature atrial complexes but no symptoms suggesting sustained arrhythmia\par \par Right bundle branch block: Chronic.

## 2021-07-14 ENCOUNTER — APPOINTMENT (OUTPATIENT)
Dept: CARDIOLOGY | Facility: CLINIC | Age: 76
End: 2021-07-14
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
## 2022-12-30 ENCOUNTER — APPOINTMENT (RX ONLY)
Dept: URBAN - METROPOLITAN AREA CLINIC 142 | Facility: CLINIC | Age: 77
Setting detail: DERMATOLOGY
End: 2022-12-30

## 2022-12-30 DIAGNOSIS — L21.8 OTHER SEBORRHEIC DERMATITIS: ICD-10-CM

## 2022-12-30 DIAGNOSIS — L57.0 ACTINIC KERATOSIS: ICD-10-CM

## 2022-12-30 DIAGNOSIS — L82.1 OTHER SEBORRHEIC KERATOSIS: ICD-10-CM

## 2022-12-30 PROCEDURE — ? PRESCRIPTION MEDICATION MANAGEMENT

## 2022-12-30 PROCEDURE — ? LIQUID NITROGEN

## 2022-12-30 PROCEDURE — 99214 OFFICE O/P EST MOD 30 MIN: CPT | Mod: 25

## 2022-12-30 PROCEDURE — 17003 DESTRUCT PREMALG LES 2-14: CPT

## 2022-12-30 PROCEDURE — 17000 DESTRUCT PREMALG LESION: CPT

## 2022-12-30 PROCEDURE — ? COUNSELING

## 2022-12-30 ASSESSMENT — LOCATION SIMPLE DESCRIPTION DERM
LOCATION SIMPLE: NOSE
LOCATION SIMPLE: RIGHT TEMPLE
LOCATION SIMPLE: RIGHT ELBOW

## 2022-12-30 ASSESSMENT — LOCATION ZONE DERM
LOCATION ZONE: NOSE
LOCATION ZONE: FACE
LOCATION ZONE: ARM

## 2022-12-30 ASSESSMENT — LOCATION DETAILED DESCRIPTION DERM
LOCATION DETAILED: RIGHT INFERIOR TEMPLE
LOCATION DETAILED: RIGHT ELBOW
LOCATION DETAILED: NASAL ROOT

## 2022-12-30 NOTE — PROCEDURE: LIQUID NITROGEN
Detail Level: Simple
Render Post-Care Instructions In Note?: no
Aperture Size (Optional): C
Consent: The patient's consent was obtained including but not limited to risks of crusting, scabbing, blistering, scarring, darker or lighter pigmentary change, recurrence, incomplete removal and infection.
Number Of Freeze-Thaw Cycles: 1 freeze-thaw cycle
Show Aperture Variable?: Yes
Duration Of Freeze Thaw-Cycle (Seconds): 5
Post-Care Instructions: I reviewed with the patient in detail post-care instructions. Patient is to wear sunprotection, and avoid picking at any of the treated lesions. Pt may apply Vaseline to crusted or scabbing areas.

## 2022-12-30 NOTE — PROCEDURE: PRESCRIPTION MEDICATION MANAGEMENT
Continue Regimen: Ketoconazole 2% shampoo lather to scalp twice weekly.  Let sit x 3-5 min then rinse
Render In Strict Bullet Format?: No
Detail Level: Zone

## 2023-06-30 ENCOUNTER — APPOINTMENT (RX ONLY)
Dept: URBAN - METROPOLITAN AREA CLINIC 142 | Facility: CLINIC | Age: 78
Setting detail: DERMATOLOGY
End: 2023-06-30

## 2023-06-30 DIAGNOSIS — L81.4 OTHER MELANIN HYPERPIGMENTATION: ICD-10-CM

## 2023-06-30 DIAGNOSIS — L57.8 OTHER SKIN CHANGES DUE TO CHRONIC EXPOSURE TO NONIONIZING RADIATION: ICD-10-CM

## 2023-06-30 DIAGNOSIS — L85.3 XEROSIS CUTIS: ICD-10-CM

## 2023-06-30 DIAGNOSIS — L21.8 OTHER SEBORRHEIC DERMATITIS: ICD-10-CM

## 2023-06-30 DIAGNOSIS — D18.0 HEMANGIOMA: ICD-10-CM

## 2023-06-30 DIAGNOSIS — L82.1 OTHER SEBORRHEIC KERATOSIS: ICD-10-CM

## 2023-06-30 DIAGNOSIS — Z12.83 ENCOUNTER FOR SCREENING FOR MALIGNANT NEOPLASM OF SKIN: ICD-10-CM

## 2023-06-30 PROBLEM — D18.01 HEMANGIOMA OF SKIN AND SUBCUTANEOUS TISSUE: Status: ACTIVE | Noted: 2023-06-30

## 2023-06-30 PROCEDURE — ? COUNSELING

## 2023-06-30 PROCEDURE — 99213 OFFICE O/P EST LOW 20 MIN: CPT

## 2023-06-30 ASSESSMENT — LOCATION DETAILED DESCRIPTION DERM
LOCATION DETAILED: LEFT PROXIMAL DORSAL FOREARM
LOCATION DETAILED: RIGHT POSTERIOR SHOULDER
LOCATION DETAILED: RIGHT PROXIMAL PRETIBIAL REGION
LOCATION DETAILED: RIGHT CENTRAL FRONTAL SCALP
LOCATION DETAILED: RIGHT DISTAL DORSAL FOREARM
LOCATION DETAILED: LEFT DISTAL DORSAL FOREARM
LOCATION DETAILED: RIGHT ANTERIOR SHOULDER
LOCATION DETAILED: LEFT POSTERIOR SHOULDER
LOCATION DETAILED: LEFT PROXIMAL CALF
LOCATION DETAILED: RIGHT ELBOW
LOCATION DETAILED: RIGHT CLAVICULAR NECK
LOCATION DETAILED: RIGHT INFERIOR CENTRAL MALAR CHEEK
LOCATION DETAILED: RIGHT PROXIMAL CALF
LOCATION DETAILED: LEFT PROXIMAL PRETIBIAL REGION
LOCATION DETAILED: LEFT CENTRAL MALAR CHEEK

## 2023-06-30 ASSESSMENT — LOCATION SIMPLE DESCRIPTION DERM
LOCATION SIMPLE: RIGHT ANTERIOR NECK
LOCATION SIMPLE: RIGHT SHOULDER
LOCATION SIMPLE: RIGHT PRETIBIAL REGION
LOCATION SIMPLE: SCALP
LOCATION SIMPLE: RIGHT CALF
LOCATION SIMPLE: LEFT CALF
LOCATION SIMPLE: LEFT PRETIBIAL REGION
LOCATION SIMPLE: LEFT FOREARM
LOCATION SIMPLE: RIGHT CHEEK
LOCATION SIMPLE: RIGHT ELBOW
LOCATION SIMPLE: RIGHT FOREARM
LOCATION SIMPLE: LEFT CHEEK
LOCATION SIMPLE: LEFT SHOULDER

## 2023-06-30 ASSESSMENT — LOCATION ZONE DERM
LOCATION ZONE: ARM
LOCATION ZONE: FACE
LOCATION ZONE: SCALP
LOCATION ZONE: NECK
LOCATION ZONE: LEG

## 2023-08-23 ENCOUNTER — RX ONLY (OUTPATIENT)
Age: 78
Setting detail: RX ONLY
End: 2023-08-23

## 2023-09-13 ENCOUNTER — RX ONLY (OUTPATIENT)
Age: 78
Setting detail: RX ONLY
End: 2023-09-13

## 2024-02-15 ENCOUNTER — APPOINTMENT (RX ONLY)
Dept: URBAN - METROPOLITAN AREA CLINIC 142 | Facility: CLINIC | Age: 79
Setting detail: DERMATOLOGY
End: 2024-02-15

## 2024-02-15 DIAGNOSIS — D18.0 HEMANGIOMA: ICD-10-CM

## 2024-02-15 DIAGNOSIS — L81.4 OTHER MELANIN HYPERPIGMENTATION: ICD-10-CM

## 2024-02-15 DIAGNOSIS — L82.1 OTHER SEBORRHEIC KERATOSIS: ICD-10-CM

## 2024-02-15 DIAGNOSIS — D22 MELANOCYTIC NEVI: ICD-10-CM

## 2024-02-15 DIAGNOSIS — L57.8 OTHER SKIN CHANGES DUE TO CHRONIC EXPOSURE TO NONIONIZING RADIATION: ICD-10-CM | Status: INADEQUATELY CONTROLLED

## 2024-02-15 DIAGNOSIS — L57.0 ACTINIC KERATOSIS: ICD-10-CM

## 2024-02-15 DIAGNOSIS — Z71.89 OTHER SPECIFIED COUNSELING: ICD-10-CM

## 2024-02-15 DIAGNOSIS — L85.3 XEROSIS CUTIS: ICD-10-CM

## 2024-02-15 PROBLEM — D22.5 MELANOCYTIC NEVI OF TRUNK: Status: ACTIVE | Noted: 2024-02-15

## 2024-02-15 PROBLEM — D22.61 MELANOCYTIC NEVI OF RIGHT UPPER LIMB, INCLUDING SHOULDER: Status: ACTIVE | Noted: 2024-02-15

## 2024-02-15 PROBLEM — D22.62 MELANOCYTIC NEVI OF LEFT UPPER LIMB, INCLUDING SHOULDER: Status: ACTIVE | Noted: 2024-02-15

## 2024-02-15 PROBLEM — D18.01 HEMANGIOMA OF SKIN AND SUBCUTANEOUS TISSUE: Status: ACTIVE | Noted: 2024-02-15

## 2024-02-15 PROCEDURE — 99213 OFFICE O/P EST LOW 20 MIN: CPT | Mod: 25

## 2024-02-15 PROCEDURE — ? LIQUID NITROGEN

## 2024-02-15 PROCEDURE — 17000 DESTRUCT PREMALG LESION: CPT

## 2024-02-15 PROCEDURE — ? SUNSCREEN RECOMMENDATIONS

## 2024-02-15 PROCEDURE — 17003 DESTRUCT PREMALG LES 2-14: CPT

## 2024-02-15 PROCEDURE — ? COUNSELING

## 2024-02-15 ASSESSMENT — LOCATION DETAILED DESCRIPTION DERM
LOCATION DETAILED: RIGHT SUPERIOR FOREHEAD
LOCATION DETAILED: RIGHT PROXIMAL POSTERIOR UPPER ARM
LOCATION DETAILED: MID TRAPEZIAL NECK
LOCATION DETAILED: RIGHT INFERIOR MEDIAL UPPER BACK
LOCATION DETAILED: LEFT LATERAL FOREHEAD
LOCATION DETAILED: RIGHT MEDIAL UPPER BACK
LOCATION DETAILED: LEFT DISTAL POSTERIOR UPPER ARM
LOCATION DETAILED: RIGHT INFERIOR TEMPLE
LOCATION DETAILED: RIGHT LATERAL SUPERIOR CHEST
LOCATION DETAILED: RIGHT DISTAL CALF
LOCATION DETAILED: EPIGASTRIC SKIN
LOCATION DETAILED: RIGHT RIB CAGE
LOCATION DETAILED: RIGHT SUPERIOR MEDIAL MIDBACK
LOCATION DETAILED: RIGHT INFERIOR ANTERIOR NECK
LOCATION DETAILED: RIGHT ANTERIOR DISTAL UPPER ARM
LOCATION DETAILED: LEFT POSTERIOR SHOULDER
LOCATION DETAILED: LEFT ANTERIOR PROXIMAL UPPER ARM
LOCATION DETAILED: RIGHT POSTERIOR SHOULDER
LOCATION DETAILED: RIGHT PROXIMAL PRETIBIAL REGION
LOCATION DETAILED: RIGHT MEDIAL SUPERIOR CHEST

## 2024-02-15 ASSESSMENT — LOCATION SIMPLE DESCRIPTION DERM
LOCATION SIMPLE: ABDOMEN
LOCATION SIMPLE: RIGHT UPPER ARM
LOCATION SIMPLE: LEFT UPPER ARM
LOCATION SIMPLE: RIGHT LOWER BACK
LOCATION SIMPLE: TRAPEZIAL NECK
LOCATION SIMPLE: RIGHT POSTERIOR UPPER ARM
LOCATION SIMPLE: RIGHT ANTERIOR NECK
LOCATION SIMPLE: CHEST
LOCATION SIMPLE: RIGHT FOREHEAD
LOCATION SIMPLE: RIGHT CALF
LOCATION SIMPLE: LEFT POSTERIOR UPPER ARM
LOCATION SIMPLE: RIGHT SHOULDER
LOCATION SIMPLE: LEFT FOREHEAD
LOCATION SIMPLE: RIGHT PRETIBIAL REGION
LOCATION SIMPLE: RIGHT TEMPLE
LOCATION SIMPLE: LEFT SHOULDER
LOCATION SIMPLE: RIGHT UPPER BACK

## 2024-02-15 ASSESSMENT — LOCATION ZONE DERM
LOCATION ZONE: FACE
LOCATION ZONE: NECK
LOCATION ZONE: LEG
LOCATION ZONE: ARM
LOCATION ZONE: TRUNK

## 2024-08-22 ENCOUNTER — APPOINTMENT (RX ONLY)
Dept: URBAN - METROPOLITAN AREA CLINIC 142 | Facility: CLINIC | Age: 79
Setting detail: DERMATOLOGY
End: 2024-08-22

## 2024-08-22 DIAGNOSIS — L82.1 OTHER SEBORRHEIC KERATOSIS: ICD-10-CM

## 2024-08-22 DIAGNOSIS — D22 MELANOCYTIC NEVI: ICD-10-CM

## 2024-08-22 DIAGNOSIS — L57.0 ACTINIC KERATOSIS: ICD-10-CM

## 2024-08-22 DIAGNOSIS — D18.0 HEMANGIOMA: ICD-10-CM

## 2024-08-22 DIAGNOSIS — L82.0 INFLAMED SEBORRHEIC KERATOSIS: ICD-10-CM

## 2024-08-22 DIAGNOSIS — L81.4 OTHER MELANIN HYPERPIGMENTATION: ICD-10-CM

## 2024-08-22 PROBLEM — D22.5 MELANOCYTIC NEVI OF TRUNK: Status: ACTIVE | Noted: 2024-08-22

## 2024-08-22 PROBLEM — D18.01 HEMANGIOMA OF SKIN AND SUBCUTANEOUS TISSUE: Status: ACTIVE | Noted: 2024-08-22

## 2024-08-22 PROCEDURE — 99213 OFFICE O/P EST LOW 20 MIN: CPT | Mod: 25

## 2024-08-22 PROCEDURE — 17110 DESTRUCTION B9 LES UP TO 14: CPT

## 2024-08-22 PROCEDURE — ? COUNSELING

## 2024-08-22 PROCEDURE — ? LIQUID NITROGEN

## 2024-08-22 PROCEDURE — 17000 DESTRUCT PREMALG LESION: CPT | Mod: 59

## 2024-08-22 ASSESSMENT — LOCATION DETAILED DESCRIPTION DERM
LOCATION DETAILED: EPIGASTRIC SKIN
LOCATION DETAILED: RIGHT LATERAL EYEBROW
LOCATION DETAILED: RIGHT LATERAL ABDOMEN
LOCATION DETAILED: PERIUMBILICAL SKIN
LOCATION DETAILED: LEFT MID-UPPER BACK
LOCATION DETAILED: RIGHT SUPERIOR LATERAL MIDBACK
LOCATION DETAILED: RIGHT LATERAL UPPER BACK
LOCATION DETAILED: RIGHT SUPERIOR MEDIAL UPPER BACK
LOCATION DETAILED: RIGHT RIB CAGE
LOCATION DETAILED: RIGHT INFERIOR UPPER BACK

## 2024-08-22 ASSESSMENT — LOCATION ZONE DERM
LOCATION ZONE: FACE
LOCATION ZONE: TRUNK

## 2024-08-22 ASSESSMENT — LOCATION SIMPLE DESCRIPTION DERM
LOCATION SIMPLE: ABDOMEN
LOCATION SIMPLE: LEFT UPPER BACK
LOCATION SIMPLE: RIGHT EYEBROW
LOCATION SIMPLE: RIGHT LOWER BACK
LOCATION SIMPLE: RIGHT UPPER BACK

## 2024-08-22 NOTE — PROCEDURE: LIQUID NITROGEN
Consent: The patient's consent was obtained including but not limited to risks of crusting, scabbing, blistering, scarring, darker or lighter pigmentary change, recurrence, incomplete removal and infection.
Include Z78.9 (Other Specified Conditions Influencing Health Status) As An Associated Diagnosis?: No
Spray Paint Text: The liquid nitrogen was applied to the skin utilizing a spray paint frosting technique.
Show Aperture Variable?: Yes
Medical Necessity Clause: This procedure was medically necessary because the lesions that were treated were:
Post-Care Instructions: I reviewed with the patient in detail post-care instructions. Patient is to wear sunprotection, and avoid picking at any of the treated lesions. Pt may apply Vaseline to crusted or scabbing areas.
Detail Level: Detailed
Medical Necessity Information: It is in your best interest to select a reason for this procedure from the list below. All of these items fulfill various CMS LCD requirements except the new and changing color options.
Duration Of Freeze Thaw-Cycle (Seconds): 2
Number Of Freeze-Thaw Cycles: 1 freeze-thaw cycle

## 2024-11-14 NOTE — ASU PATIENT PROFILE, ADULT - ATTEMPT TO OOB
Patient: Satinder Silver    Procedure Summary       Date: 11/14/24 Room / Location: [facility]    Anesthesia Start: 1048 Anesthesia Stop: 1109    Procedure: COLONOSCOPY Diagnosis:       Screening for colon cancer      Screening for colon cancer    Scheduled Providers: Meño Breaux MD Responsible Provider: ISADORA Avila    Anesthesia Type: MAC ASA Status: 3            Anesthesia Type: MAC    Vitals Value Taken Time   /63 11/14/24 1113   Temp 36.3 °C (97.3 °F) 11/14/24 1108   Pulse 86 11/14/24 1113   Resp 13 11/14/24 1113   SpO2 96 11/14/24 1113       Anesthesia Post Evaluation    Patient location during evaluation: bedside  Patient participation: complete - patient participated  Level of consciousness: awake  Pain score: 0  Pain management: adequate  Airway patency: patent  Cardiovascular status: acceptable  Respiratory status: acceptable  Hydration status: acceptable  Postoperative Nausea and Vomiting: none      There were no known notable events for this encounter.    
no

## 2025-02-27 ENCOUNTER — APPOINTMENT (OUTPATIENT)
Dept: URBAN - METROPOLITAN AREA CLINIC 142 | Facility: CLINIC | Age: 80
Setting detail: DERMATOLOGY
End: 2025-02-27

## 2025-02-27 DIAGNOSIS — L82.1 OTHER SEBORRHEIC KERATOSIS: ICD-10-CM

## 2025-02-27 DIAGNOSIS — L81.4 OTHER MELANIN HYPERPIGMENTATION: ICD-10-CM

## 2025-02-27 DIAGNOSIS — D18.0 HEMANGIOMA: ICD-10-CM

## 2025-02-27 DIAGNOSIS — D22 MELANOCYTIC NEVI: ICD-10-CM

## 2025-02-27 PROBLEM — D22.9 MELANOCYTIC NEVI, UNSPECIFIED: Status: ACTIVE | Noted: 2025-02-27

## 2025-02-27 PROBLEM — D18.01 HEMANGIOMA OF SKIN AND SUBCUTANEOUS TISSUE: Status: ACTIVE | Noted: 2025-02-27

## 2025-02-27 PROCEDURE — 99213 OFFICE O/P EST LOW 20 MIN: CPT

## 2025-02-27 PROCEDURE — ? COUNSELING

## 2025-02-27 ASSESSMENT — LOCATION DETAILED DESCRIPTION DERM: LOCATION DETAILED: EPIGASTRIC SKIN

## 2025-02-27 ASSESSMENT — LOCATION ZONE DERM: LOCATION ZONE: TRUNK

## 2025-02-27 ASSESSMENT — LOCATION SIMPLE DESCRIPTION DERM: LOCATION SIMPLE: ABDOMEN

## 2025-08-21 ENCOUNTER — APPOINTMENT (OUTPATIENT)
Dept: URBAN - METROPOLITAN AREA CLINIC 142 | Facility: CLINIC | Age: 80
Setting detail: DERMATOLOGY
End: 2025-08-21

## 2025-08-21 DIAGNOSIS — L82.1 OTHER SEBORRHEIC KERATOSIS: ICD-10-CM

## 2025-08-21 DIAGNOSIS — L85.3 XEROSIS CUTIS: ICD-10-CM | Status: INADEQUATELY CONTROLLED

## 2025-08-21 DIAGNOSIS — L57.0 ACTINIC KERATOSIS: ICD-10-CM

## 2025-08-21 DIAGNOSIS — L21.8 OTHER SEBORRHEIC DERMATITIS: ICD-10-CM

## 2025-08-21 DIAGNOSIS — D18.0 HEMANGIOMA: ICD-10-CM

## 2025-08-21 DIAGNOSIS — D22 MELANOCYTIC NEVI: ICD-10-CM

## 2025-08-21 DIAGNOSIS — L81.4 OTHER MELANIN HYPERPIGMENTATION: ICD-10-CM

## 2025-08-21 PROBLEM — D18.01 HEMANGIOMA OF SKIN AND SUBCUTANEOUS TISSUE: Status: ACTIVE | Noted: 2025-08-21

## 2025-08-21 PROBLEM — D22.61 MELANOCYTIC NEVI OF RIGHT UPPER LIMB, INCLUDING SHOULDER: Status: ACTIVE | Noted: 2025-08-21

## 2025-08-21 PROBLEM — D22.5 MELANOCYTIC NEVI OF TRUNK: Status: ACTIVE | Noted: 2025-08-21

## 2025-08-21 PROCEDURE — ?: Mod: 25

## 2025-08-21 PROCEDURE — ?

## 2025-08-21 PROCEDURE — ? PRESCRIPTION MEDICATION MANAGEMENT

## 2025-08-21 PROCEDURE — ? COUNSELING

## 2025-08-21 PROCEDURE — ? PRESCRIPTION

## 2025-08-21 PROCEDURE — ? LIQUID NITROGEN

## 2025-08-21 RX ORDER — KETOCONAZOLE 20 MG/ML
SHAMPOO, SUSPENSION TOPICAL BID
Qty: 120 | Refills: 11 | Status: ERX | COMMUNITY
Start: 2025-08-21

## 2025-08-21 RX ADMIN — KETOCONAZOLE: 20 SHAMPOO, SUSPENSION TOPICAL at 00:00

## 2025-08-21 ASSESSMENT — LOCATION SIMPLE DESCRIPTION DERM
LOCATION SIMPLE: RIGHT UPPER BACK
LOCATION SIMPLE: LEFT ANTERIOR NECK
LOCATION SIMPLE: RIGHT POSTERIOR UPPER ARM
LOCATION SIMPLE: ABDOMEN
LOCATION SIMPLE: LEFT EAR
LOCATION SIMPLE: RIGHT FOREARM
LOCATION SIMPLE: LEFT POSTERIOR UPPER ARM
LOCATION SIMPLE: RIGHT SHOULDER
LOCATION SIMPLE: LEFT THIGH
LOCATION SIMPLE: CHEST
LOCATION SIMPLE: UPPER BACK
LOCATION SIMPLE: LEFT FOREARM

## 2025-08-21 ASSESSMENT — LOCATION DETAILED DESCRIPTION DERM
LOCATION DETAILED: RIGHT PROXIMAL POSTERIOR UPPER ARM
LOCATION DETAILED: LEFT INFERIOR ANTERIOR NECK
LOCATION DETAILED: RIGHT PROXIMAL DORSAL FOREARM
LOCATION DETAILED: LEFT ANTERIOR PROXIMAL THIGH
LOCATION DETAILED: RIGHT SUPERIOR MEDIAL UPPER BACK
LOCATION DETAILED: RIGHT MID-UPPER BACK
LOCATION DETAILED: LEFT PROXIMAL POSTERIOR UPPER ARM
LOCATION DETAILED: RIGHT RIB CAGE
LOCATION DETAILED: RIGHT LATERAL SUPERIOR CHEST
LOCATION DETAILED: RIGHT DISTAL POSTERIOR UPPER ARM
LOCATION DETAILED: LEFT PROXIMAL DORSAL FOREARM
LOCATION DETAILED: EPIGASTRIC SKIN
LOCATION DETAILED: LEFT CRUS OF HELIX
LOCATION DETAILED: RIGHT ANTERIOR SHOULDER
LOCATION DETAILED: RIGHT SUPERIOR UPPER BACK
LOCATION DETAILED: RIGHT MEDIAL SUPERIOR CHEST
LOCATION DETAILED: SUPERIOR THORACIC SPINE
LOCATION DETAILED: STERNAL NOTCH

## 2025-08-21 ASSESSMENT — LOCATION ZONE DERM
LOCATION ZONE: TRUNK
LOCATION ZONE: LEG
LOCATION ZONE: NECK
LOCATION ZONE: EAR
LOCATION ZONE: ARM